# Patient Record
Sex: FEMALE | Race: WHITE
[De-identification: names, ages, dates, MRNs, and addresses within clinical notes are randomized per-mention and may not be internally consistent; named-entity substitution may affect disease eponyms.]

---

## 2017-09-02 ENCOUNTER — HOSPITAL ENCOUNTER (EMERGENCY)
Dept: HOSPITAL 26 - MED | Age: 22
Discharge: HOME | End: 2017-09-02
Payer: SELF-PAY

## 2017-09-02 VITALS — HEIGHT: 65 IN | WEIGHT: 104.38 LBS | BODY MASS INDEX: 17.39 KG/M2

## 2017-09-02 VITALS — DIASTOLIC BLOOD PRESSURE: 64 MMHG | SYSTOLIC BLOOD PRESSURE: 118 MMHG

## 2017-09-02 DIAGNOSIS — N39.0: Primary | ICD-10-CM

## 2017-09-02 LAB
APPEARANCE UR: (no result)
BILIRUB UR QL STRIP: NEGATIVE
COLOR UR: YELLOW
GLUCOSE UR STRIP-MCNC: NEGATIVE MG/DL
HGB UR QL STRIP: NEGATIVE
LEUKOCYTE ESTERASE UR QL STRIP: (no result)
NITRITE UR QL STRIP: NEGATIVE
PH UR STRIP: 7.5 [PH] (ref 5–9)
RBC #/AREA URNS HPF: (no result) /HPF (ref 0–5)
WBC,URINE: (no result) /HPF (ref 0–5)

## 2017-09-02 NOTE — NUR
PATIENT PRESENTS TO ED WITH C/O FREQUENT UTI'S X 6MONTHS;DYSURIA, HESITANCY, 
FREQUENCY, BURNING PAIN X 4 DAYS;DENIES HEMATURIA;PT STATES SHE HAS DISCHARGES 
COMING OUT;DENIES N/V/D; SKIN IS PINK/WARM/DRY; AAOX4 WITH EVEN AND STEADY 
GAIT; LUNGS CLEAR BL; HR EVEN AND REGULAR; PT DENIES ANY FEVER, CP, SOB, OR 
COUGH AT THIS TIME; PATIENT STATES PAIN OF 6/10 AT THIS TIME;PATIENT POSITIONED 
FOR COMFORT; HOB ELEVATED; BEDRAILS UP X2; BED DOWN. ER MD MADE AWARE OF PT 
STATUS.

## 2017-09-02 NOTE — NUR
Patient discharged with v/s stable. Written and verbal after care instructions 
given and explained. Patient alert, oriented and verbalized understanding of 
instructions. Ambulatory with steady gait. All questions addressed prior to 
discharge. ID band removed. Patient advised to follow up with PMD. Rx of 
DIFLUCAN AND CEPHALEXIN given. Patient educated on indication of medication 
including possible reaction and side effects. Opportunity to ask questions 
provided and answered.

## 2018-01-04 ENCOUNTER — HOSPITAL ENCOUNTER (EMERGENCY)
Dept: HOSPITAL 26 - MED | Age: 23
Discharge: HOME | End: 2018-01-04
Payer: COMMERCIAL

## 2018-01-04 VITALS — HEIGHT: 65 IN | BODY MASS INDEX: 21.99 KG/M2 | WEIGHT: 132 LBS

## 2018-01-04 VITALS — DIASTOLIC BLOOD PRESSURE: 77 MMHG | SYSTOLIC BLOOD PRESSURE: 120 MMHG

## 2018-01-04 VITALS — DIASTOLIC BLOOD PRESSURE: 72 MMHG | SYSTOLIC BLOOD PRESSURE: 120 MMHG

## 2018-01-04 DIAGNOSIS — N39.0: Primary | ICD-10-CM

## 2018-01-04 DIAGNOSIS — R30.0: ICD-10-CM

## 2018-01-04 DIAGNOSIS — R10.30: ICD-10-CM

## 2018-01-04 NOTE — NUR
Pelvic exam performed by md Storey with Antonio STALLWORTH at bedside for entire 
examination.  Patient tolerated procedure well.  Patient assisted to position 
of comfort after examination. Cultures collected and sent to lab

## 2018-01-04 NOTE — NUR
PT CAME IN C/O PAINFUL URINATION, WHITE ODORUS DISCHARGE, BURNING/PAIN/ITCH 
WITH URINAITON. DENIES BEING ON ANY MEDICATIONS OR SEXUAL ACTIVITY. A&OX4. NO 
DISTRESS. MD AWARE. CONTINUE TO MONITOR.

## 2018-01-04 NOTE — NUR
Patient discharged with v/s stable. Written and verbal after care instructions 
given and explained. 

Patient alert, oriented and verbalized understanding of instructions. 
Ambulatory with to car. All questions addressed prior to discharge. ID band 
removed. Patient advised to follow up with PMD. Rx of Keflex and MOtrin given. 
Patient educated on indication of medication including possible reaction and 
side effects. Opportunity to ask questions provided and answered.

## 2018-01-05 LAB
APPEARANCE UR: (no result)
BILIRUB UR QL STRIP: NEGATIVE
COLOR UR: (no result)
GLUCOSE UR STRIP-MCNC: NEGATIVE MG/DL
HGB UR QL STRIP: (no result)
LEUKOCYTE ESTERASE UR QL STRIP: (no result)
NITRITE UR QL STRIP: POSITIVE
PH UR STRIP: 6 [PH] (ref 5–9)
RBC #/AREA URNS HPF: (no result) /HPF (ref 0–5)
WBC,URINE: (no result) /HPF (ref 0–5)